# Patient Record
(demographics unavailable — no encounter records)

---

## 2025-01-23 NOTE — HISTORY OF PRESENT ILLNESS
[de-identified] : 9/2017 Dr. Abraham  Procedure:  EGD EGD: After obtaining informed consent and explaining the risks / benefits and alternatives to the procedures to the patient  ( including but not limited to bleeding / perforation / anesthesia complication) and after the patient expressed understanding and a desire to proceed, an EGD was performed.   Initially the  Al upper endoscope was advanced under direct vision into the oropharynx, esophagus, stomach and up to the 2nd portion of the duodenum. The duodenal mucosa appeared normal. Biopsies were taken to exclude the possibility of celiac disease. The scope was subsequently withdrawn under direct vision into the antrum. The antrum appeared mildly erythematous. Biopsies were taken to evaluate for gastritis and exclude H. pylori. On retroflexion, the proximal stomach was normal. On forward viewing endoscopy from the esophagus, the esophagus was normal. Distal esophageal biopsies were taken. The patient tolerated the procedure without difficulty. Indication:  Other - epigastric abdominal pain PRE-procedure Diagnosis:  epigastric abdominal pain POST-procedure Diagnosis:  gastritis Anesthesia:  Deep Sedation Specimen removed:  see above Findings: mild gastritis Follow-Up Plan: follow up pathology results start H2B BID patient to schedule colonoscopy  FINAL DIAGNOSIS   A. Duodenum, biopsy: 	Duodenal mucosa showing no significant histopathologic changes.  B. Stomach, biopsy: 	Gastric mucosa, antral and body/fundus type, showing no significant     histopathologic changes. 	No Helicobacter-like organisms identified on Diff-Quik stain. 	No intestinal metaplasia identified on Alcian blue-PAS stain.  C. Esophagus, distal, biopsy: 	Esophageal squamous mucosa showing no significant histopathologic changes. 	Alcian blue-PAS stain was performed.  [FreeTextEntry1] : 9/2017 Dr. Abraham  GI PROCEDURE NOTE Consent: Consent obtained after a full discussion of risks, benefits, and alternatives to the procedure; these were understood and agreed upon prior to initiation of the procedure.  All questions were answered. Procedure:  Colonoscopy Colonoscopy: After obtaining informed consent and explaining the risks / benefits and alternatives to the procedures to the patient  ( including but not limited to bleeding / perforation / anesthesia complication/possibility of missed lesions) and after the patient expressed understanding and a desire to proceed, a COLONOSCOPY was performed.   Normal rectal/KARLA  For the COLONOSCOPY,  a    AL colonoscope was advanced from the rectum up to and including the cecum. The ileocecal valve was identified as well as the appendiceal orifice.  -few right sided diverticula -internal hemorrhoids seen on retroflexion in the rectum -the patient tolerated the procedure without difficulty.  -the preparation for the colonoscopy was adequate. Indication:  Other - colon cancer screening, history of breast cancer PRE-procedure Diagnosis:  colon cancer screening, history of breast cancer POST-procedure Diagnosis:  hemorrhoids, diverticulosis Anesthesia:  Deep Sedation Specimen removed:  none Findings: hemorrhoids, diverticulosis Follow-Up Plan: high fiber diet repeat colonoscopy in 5 years

## 2025-01-23 NOTE — ASSESSMENT
[FreeTextEntry1] : Colonoscopy with Dr. Abraham - Indications: Screening - Reviewed importance of adequate colon cleansing prior to colonoscopy. Instructed to contact office if he/she has any questions regarding prep. - Explained the risks (including but not limited to cardiopulmonary anesthetic complications, bleeding, perforation, aspiration, missed lesions and misidentified sites of lesions - complications that might necessitate hospitalization or surgery), benefits and alternatives of colonoscopy were reviewed with the patient. Preparation for the procedure was reviewed with the patient. The patient was informed that she/he would be given intravenous anesthesia by an anesthesiologist for the procedure. The patient was informed that a family member or friend must drive the patient following recovery from the procedure. Patient understands and agrees, all questions answered. - Holds: None - Prep: Suprep

## 2025-07-09 NOTE — PLAN
[FreeTextEntry1] : f/u in one year for annual pending labs  I have spent 30 minutes of time on the encounter on acute issues which excludes teaching and separately reported services of the preventative exam

## 2025-07-09 NOTE — HISTORY OF PRESENT ILLNESS
[de-identified] : Acute complaints  1) Left leg numbness - worse at night and very uncomfortable  2) Chest tightness and pain - sometimes when walks but improves when rest couple of minutes  Stress echo 4/2018  Normal left ventricular size and systolic function, EF 66 %.       Mildly increased left atrial size.       There is mild mitral regurgitation.         Pulmonary artery systolic pressure is normal.  Mild tricuspid valve       regurgitation.     3) ENT referral-trouble hearing for the last 8 months thinks maybe both ears   - Mammogram: 12/2024 repeat one year  - Bone density scan: Osteopenia of the spine and left hip DEXA 10/17/2023 due10/2025 - Colonoscopy: 5/12/2025 Colonoscopy in 5 years pending results

## 2025-07-09 NOTE — PHYSICAL EXAM
[Normal Sclera/Conjunctiva] : normal sclera/conjunctiva [Normal Outer Ear/Nose] : the outer ears and nose were normal in appearance [No JVD] : no jugular venous distention [Normal] : normal rate, regular rhythm, normal S1 and S2 and no murmur heard [Grossly Normal Strength/Tone] : grossly normal strength/tone [Coordination Grossly Intact] : coordination grossly intact [No Focal Deficits] : no focal deficits [Normal Gait] : normal gait [Normal Affect] : the affect was normal [Normal Insight/Judgement] : insight and judgment were intact [de-identified] : errythematous eruption along ant neck

## 2025-07-09 NOTE — HEALTH RISK ASSESSMENT
[Fair] : ~his/her~ current health as fair  [Good] : ~his/her~  mood as  good [No] : In the past 12 months have you used drugs other than those required for medical reasons? No [No falls in past year] : Patient reported no falls in the past year [0] : 2) Feeling down, depressed, or hopeless: Not at all (0) [PHQ-2 Negative - No further assessment needed] : PHQ-2 Negative - No further assessment needed [Yes] : Reviewed medication list for presence of high-risk medications. [Never] : Never [NO] : No [Patient reported mammogram was normal] : Patient reported mammogram was normal [Patient reported PAP Smear was normal] : Patient reported PAP Smear was normal [Patient reported bone density results were normal] : Patient reported bone density results were normal [Patient reported colonoscopy was normal] : Patient reported colonoscopy was normal [None] : None [With Family] : lives with family [# of Members in Household ___] :  household currently consist of [unfilled] member(s) [Retired] : retired [] :  [# Of Children ___] : has [unfilled] children [Feels Safe at Home] : Feels safe at home [Fully functional (bathing, dressing, toileting, transferring, walking, feeding)] : Fully functional (bathing, dressing, toileting, transferring, walking, feeding) [Fully functional (using the telephone, shopping, preparing meals, housekeeping, doing laundry, using] : Fully functional and needs no help or supervision to perform IADLs (using the telephone, shopping, preparing meals, housekeeping, doing laundry, using transportation, managing medications and managing finances) [Reports changes in hearing] : Reports changes in hearing [Reports normal functional visual acuity (ie: able to read med bottle)] : Reports normal functional visual acuity [Smoke Detector] : smoke detector [Carbon Monoxide Detector] : carbon monoxide detector [Safety elements used in home] : safety elements used in home [Seat Belt] :  uses seat belt [Sunscreen] : uses sunscreen [Little interest or pleasure doing things] : 1) Little interest or pleasure doing things [Feeling down, depressed, or hopeless] : 2) Feeling down, depressed, or hopeless [Time Spent: ___ Minutes] : I spent [unfilled] minutes performing a depression screening for this patient. [FreeTextEntry1] : 1) Left leg numbness 2) Chest tightness and pain 3) ENT referral-trouble hearing [de-identified] : walking and going to the gym [de-identified] : healthy diet  [IAZ1Hhtsm] : 0 [Change in mental status noted] : No change in mental status noted [Language] : denies difficulty with language [Behavior] : denies difficulty with behavior [Learning/Retaining New Information] : denies difficulty learning/retaining new information [Handling Complex Tasks] : denies difficulty handling complex tasks [Reasoning] : denies difficulty with reasoning [Spatial Ability and Orientation] : denies difficulty with spatial ability and orientation [Sexually Active] : not sexually active [Reports changes in dental health] : Reports no changes in dental health [MammogramDate] : 12/2024 [MammogramComments] : repeat 1 year-needs script [PapSmearDate] : 11/2023 [BoneDensityDate] : 10/2023 [BoneDensityComments] : repeat 2 years  [ColonoscopyDate] : 05/2025 [ColonoscopyComments] : repeat 5 years [de-identified] : Last vision exam 06/2025 [de-identified] : last dental visit was 2025

## 2025-07-09 NOTE — DATA REVIEWED
[FreeTextEntry1] : Abnormal EKG: normal sinus rhythm, left axis deviation, no acute changes, no prior EKG for comparison